# Patient Record
Sex: MALE | Race: NATIVE HAWAIIAN OR OTHER PACIFIC ISLANDER
[De-identification: names, ages, dates, MRNs, and addresses within clinical notes are randomized per-mention and may not be internally consistent; named-entity substitution may affect disease eponyms.]

---

## 2018-09-21 ENCOUNTER — HOSPITAL ENCOUNTER (EMERGENCY)
Dept: HOSPITAL 5 - ED | Age: 48
Discharge: HOME | End: 2018-09-21
Payer: SELF-PAY

## 2018-09-21 VITALS — SYSTOLIC BLOOD PRESSURE: 129 MMHG | DIASTOLIC BLOOD PRESSURE: 78 MMHG

## 2018-09-21 DIAGNOSIS — E11.9: ICD-10-CM

## 2018-09-21 DIAGNOSIS — I10: ICD-10-CM

## 2018-09-21 DIAGNOSIS — H83.01: Primary | ICD-10-CM

## 2018-09-21 PROCEDURE — 70450 CT HEAD/BRAIN W/O DYE: CPT

## 2018-09-21 NOTE — EMERGENCY DEPARTMENT REPORT
ED ENT HPI





- General


Chief complaint: Earache


Stated complaint: HEARING LOSS/DIZZINESS


Time Seen by Provider: 09/21/18 12:24


Source: patient, family


Mode of arrival: Ambulatory


Limitations: Language Barrier





- History of Present Illness


Initial comments: 





46 yo M presents to ER w/ complaints of dizziness and ringing in right ear.  Pt 

reports onset 2 weeks ago.  Reports associated decreased hearing in right ear.  

Pt reports initial nausea and vomiting and feeling off-balance when he walked 

immediately after onset of symptoms, but that has now resolved.  Pt initially 

saw PCP, who gave prescription for medication for dizziness.  Was then referred 

to ENT.  Pt states he had hearing test done, was examined by the physician, but 

undure of diagnosis.  Was given rx for valtrex and prednisone.  States has 

finished the medicines but is still having tinnitus and dizziness 

intermittently.  States he stopped taking the dizziness medication when he was 

prescribed the valtrex and prednisone.  Pt thinks symptoms due to loud drilling 

that occurred at work the night before his symptoms began.  Pt denies wearing 

earplugs at work.  Denies headache and ear pain.


MD complaint: other (hearing loss, dizziness)


-: week(s) (2)


Location: R ear


Severity: moderate


Consistency: intermittent


Improves with: none


Worsens with: none


Associated Symptoms: tinnitus, hearing loss.  denies: fever, discharge from ear





- Related Data


 Previous Rx's











 Medication  Instructions  Recorded  Last Taken  Type


 


Loratadine/Pseudoephedrine 1 tab PO DAILY #30 tablet 09/21/18 Unknown Rx





[Claritin-D 24Hr]    


 


Meclizine [Antivert] 25 mg PO TID PRN #20 tablet 09/21/18 Unknown Rx











 Allergies











Allergy/AdvReac Type Severity Reaction Status Date / Time


 


No Known Allergies Allergy   Unverified 09/21/18 10:20














ED Dental HPI





- General


Chief complaint: Earache


Stated complaint: HEARING LOSS/DIZZINESS


Time Seen by Provider: 09/21/18 12:24


Source: patient


Mode of arrival: Ambulatory


Limitations: No Limitations





- Related Data


 Previous Rx's











 Medication  Instructions  Recorded  Last Taken  Type


 


Loratadine/Pseudoephedrine 1 tab PO DAILY #30 tablet 09/21/18 Unknown Rx





[Claritin-D 24Hr]    


 


Meclizine [Antivert] 25 mg PO TID PRN #20 tablet 09/21/18 Unknown Rx











 Allergies











Allergy/AdvReac Type Severity Reaction Status Date / Time


 


No Known Allergies Allergy   Unverified 09/21/18 10:20














ED Review of Systems


ROS: 


Stated complaint: HEARING LOSS/DIZZINESS


Other details as noted in HPI





Comment: All other systems reviewed and negative


Constitutional: denies: fever


Eyes: other (reports intermittent blurred vision in right eye)


ENT: hearing loss, other (reports tinnitus).  denies: ear pain


Neurological: other (reports dizziness).  denies: headache, weakness, numbness





ED Past Medical Hx





- Past Medical History


Previous Medical History?: Yes


Hx Hypertension: Yes


Hx Diabetes: Yes





- Surgical History


Past Surgical History?: No





- Social History


Smoking Status: Never Smoker


Substance Use Type: None





- Medications


Home Medications: 


 Home Medications











 Medication  Instructions  Recorded  Confirmed  Last Taken  Type


 


Loratadine/Pseudoephedrine 1 tab PO DAILY #30 tablet 09/21/18  Unknown Rx





[Claritin-D 24Hr]     


 


Meclizine [Antivert] 25 mg PO TID PRN #20 tablet 09/21/18  Unknown Rx














ED Physical Exam





- General


Limitations: Language Barrier


General appearance: alert, in no apparent distress





- Head


Head exam: Present: atraumatic, normocephalic





- ENT


ENT exam: Present: other (fluid present behind bilateral TMs)





- Neck


Neck exam: Present: normal inspection, full ROM.  Absent: tenderness





- Respiratory


Respiratory exam: Present: normal lung sounds bilaterally.  Absent: respiratory 

distress





- Cardiovascular


Cardiovascular Exam: Present: regular rate, normal rhythm





- GI/Abdominal


GI/Abdominal exam: Present: soft.  Absent: tenderness





- Extremities Exam


Extremities exam: Present: normal inspection





- Neurological Exam


Neurological exam: Present: alert, oriented X3, CN II-XII intact, normal gait.  

Absent: motor sensory deficit





- Psychiatric


Psychiatric exam: Present: normal affect, normal mood





- Skin


Skin exam: Present: warm, dry, intact, normal color





ED Course


 Vital Signs











  09/21/18





  10:16


 


Temperature 98.0 F


 


Pulse Rate 92 H


 


Respiratory 18





Rate 


 


Blood Pressure 129/78


 


O2 Sat by Pulse 95





Oximetry 














ED Medical Decision Making





- Medical Decision Making





47-year-old male with history of right ear hearing loss, dizziness, tinnitus.  

Possible labyrinthitis.  Has already had a trial of steroids and antivirals.  

Head CT today negative.  We will give prescription for meclizine and Claritin-

D.  Advise follow-up with ENT that he saw previously.





- Differential Diagnosis


CVA, intracranial mass, vertigo, labrynthitis, Meneire's disease


Critical care attestation.: 


If time is entered above; I have spent that time in minutes in the direct care 

of this critically ill patient, excluding procedure time.








ED Disposition


Clinical Impression: 


 Acute labyrinthitis





Disposition: DC-01 TO HOME OR SELFCARE


Is pt being admited?: No


Condition: Stable


Instructions:  Vertigo (ED)


Additional Instructions: 


Follow-up with ENT physician that you saw before.


Prescriptions: 


Loratadine/Pseudoephedrine [Claritin-D 24Hr] 1 tab PO DAILY #30 tablet


Meclizine [Antivert] 25 mg PO TID PRN #20 tablet


 PRN Reason: Vertigo


Referrals: 


PRIMARY CARE,MD [Primary Care Provider] - 3-5 Days


Time of Disposition: 14:06


Print Language: Slovak

## 2018-09-21 NOTE — CAT SCAN REPORT
CT HEAD WITHOUT CONTRAST:



HISTORY:  Dizziness.



TECHNIQUE:  Sequential 2.5mm CT images.



COMPARISON: none.



FINDINGS:



Cerebral Parenchyma: Within normal limits.



Cerebellum:  Within normal limits.



Brainstem:  Within normal limits.



Ventricles: Normal.



Sella:  Normal.



Extra-axial spaces:  Normal.



Basal Cisterns:  Normal.



Intracranial Hemorrhage:  None.



Midline Shift:  None.



Calvarium: Normal.



Sinuses: Normal.



Mastoid Air Cells:  Normal.



Visualized Orbits:  Normal.







IMPRESSION:

Cranial CT scan within normal limits.